# Patient Record
Sex: FEMALE | Race: WHITE | Employment: PART TIME | URBAN - NONMETROPOLITAN AREA
[De-identification: names, ages, dates, MRNs, and addresses within clinical notes are randomized per-mention and may not be internally consistent; named-entity substitution may affect disease eponyms.]

---

## 2017-01-12 ENCOUNTER — OFFICE VISIT (OUTPATIENT)
Dept: FAMILY MEDICINE CLINIC | Age: 22
End: 2017-01-12

## 2017-01-12 VITALS
HEIGHT: 69 IN | TEMPERATURE: 98.4 F | DIASTOLIC BLOOD PRESSURE: 78 MMHG | SYSTOLIC BLOOD PRESSURE: 136 MMHG | HEART RATE: 95 BPM | BODY MASS INDEX: 39.99 KG/M2 | OXYGEN SATURATION: 98 % | WEIGHT: 270 LBS

## 2017-01-12 DIAGNOSIS — Z11.3 ROUTINE SCREENING FOR STI (SEXUALLY TRANSMITTED INFECTION): ICD-10-CM

## 2017-01-12 DIAGNOSIS — R10.11 RUQ PAIN: ICD-10-CM

## 2017-01-12 DIAGNOSIS — Z12.4 CERVICAL CANCER SCREENING: ICD-10-CM

## 2017-01-12 DIAGNOSIS — Z12.4 CERVICAL CANCER SCREENING: Primary | ICD-10-CM

## 2017-01-12 LAB
ALBUMIN SERPL-MCNC: 4.1 G/DL (ref 3.9–4.9)
ALP BLD-CCNC: 47 U/L (ref 40–130)
ALT SERPL-CCNC: 11 U/L (ref 0–33)
AMYLASE: 77 U/L (ref 28–100)
ANION GAP SERPL CALCULATED.3IONS-SCNC: 15 MEQ/L (ref 7–13)
AST SERPL-CCNC: 13 U/L (ref 0–35)
BASOPHILS ABSOLUTE: 0 K/UL (ref 0–0.2)
BASOPHILS RELATIVE PERCENT: 0.7 %
BILIRUB SERPL-MCNC: 0.2 MG/DL (ref 0–1.2)
BUN BLDV-MCNC: 9 MG/DL (ref 6–20)
CALCIUM SERPL-MCNC: 8.9 MG/DL (ref 8.6–10.2)
CHLORIDE BLD-SCNC: 101 MEQ/L (ref 98–107)
CO2: 21 MEQ/L (ref 22–29)
CREAT SERPL-MCNC: 0.6 MG/DL (ref 0.5–0.9)
EOSINOPHILS ABSOLUTE: 0.1 K/UL (ref 0–0.7)
EOSINOPHILS RELATIVE PERCENT: 1.6 %
GFR AFRICAN AMERICAN: >60
GFR NON-AFRICAN AMERICAN: >60
GLOBULIN: 2.4 G/DL (ref 2.3–3.5)
GLUCOSE BLD-MCNC: 116 MG/DL (ref 74–109)
HCT VFR BLD CALC: 36.6 % (ref 37–47)
HEMOGLOBIN: 12.4 G/DL (ref 12–16)
LIPASE: 26 U/L (ref 13–60)
LYMPHOCYTES ABSOLUTE: 1.6 K/UL (ref 1–4.8)
LYMPHOCYTES RELATIVE PERCENT: 24.8 %
MCH RBC QN AUTO: 28.7 PG (ref 27–31.3)
MCHC RBC AUTO-ENTMCNC: 34 % (ref 33–37)
MCV RBC AUTO: 84.4 FL (ref 82–100)
MONOCYTES ABSOLUTE: 0.4 K/UL (ref 0.2–0.8)
MONOCYTES RELATIVE PERCENT: 6.9 %
NEUTROPHILS ABSOLUTE: 4.2 K/UL (ref 1.4–6.5)
NEUTROPHILS RELATIVE PERCENT: 66 %
PDW BLD-RTO: 12.4 % (ref 11.5–14.5)
PLATELET # BLD: 247 K/UL (ref 130–400)
POTASSIUM SERPL-SCNC: 3.8 MEQ/L (ref 3.5–5.1)
RBC # BLD: 4.33 M/UL (ref 4.2–5.4)
SODIUM BLD-SCNC: 137 MEQ/L (ref 132–144)
TOTAL PROTEIN: 6.5 G/DL (ref 6.4–8.1)
WBC # BLD: 6.4 K/UL (ref 4.8–10.8)

## 2017-01-12 PROCEDURE — 99395 PREV VISIT EST AGE 18-39: CPT | Performed by: NURSE PRACTITIONER

## 2017-01-12 RX ORDER — EPINEPHRINE 0.3 MG/.3ML
INJECTION SUBCUTANEOUS
Qty: 1 EACH | Refills: 1 | Status: SHIPPED | OUTPATIENT
Start: 2017-01-12

## 2017-01-12 RX ORDER — LEVONORGESTREL AND ETHINYL ESTRADIOL 0.1-0.02MG
1 KIT ORAL DAILY
Qty: 1 PACKET | Refills: 11 | Status: SHIPPED | OUTPATIENT
Start: 2017-01-12 | End: 2017-10-10

## 2017-01-14 LAB
CHLAMYDIA TRACHOMATIS AMPLIFIED DET: NEGATIVE
N GONORRHOEAE AMPLIFIED DET: NEGATIVE
SPECIMEN SOURCE: NORMAL

## 2017-07-17 ENCOUNTER — OFFICE VISIT (OUTPATIENT)
Dept: FAMILY MEDICINE CLINIC | Age: 22
End: 2017-07-17

## 2017-07-17 VITALS
HEART RATE: 73 BPM | SYSTOLIC BLOOD PRESSURE: 124 MMHG | OXYGEN SATURATION: 98 % | TEMPERATURE: 98.7 F | BODY MASS INDEX: 41.18 KG/M2 | DIASTOLIC BLOOD PRESSURE: 72 MMHG | HEIGHT: 69 IN | WEIGHT: 278 LBS

## 2017-07-17 DIAGNOSIS — R10.13 EPIGASTRIC PAIN: ICD-10-CM

## 2017-07-17 DIAGNOSIS — R11.0 NAUSEA: ICD-10-CM

## 2017-07-17 DIAGNOSIS — R11.0 NAUSEA: Primary | ICD-10-CM

## 2017-07-17 DIAGNOSIS — Z83.79 FAMILY HISTORY OF CROHN'S DISEASE: ICD-10-CM

## 2017-07-17 PROCEDURE — 1036F TOBACCO NON-USER: CPT | Performed by: NURSE PRACTITIONER

## 2017-07-17 PROCEDURE — 99213 OFFICE O/P EST LOW 20 MIN: CPT | Performed by: NURSE PRACTITIONER

## 2017-07-17 PROCEDURE — G8417 CALC BMI ABV UP PARAM F/U: HCPCS | Performed by: NURSE PRACTITIONER

## 2017-07-17 PROCEDURE — G8427 DOCREV CUR MEDS BY ELIG CLIN: HCPCS | Performed by: NURSE PRACTITIONER

## 2017-07-17 RX ORDER — OMEPRAZOLE 20 MG/1
20 CAPSULE, DELAYED RELEASE ORAL DAILY
Qty: 30 CAPSULE | Refills: 1 | Status: SHIPPED | OUTPATIENT
Start: 2017-07-17 | End: 2017-10-10 | Stop reason: ALTCHOICE

## 2017-07-17 RX ORDER — ASCORBIC ACID 500 MG
1000 TABLET ORAL DAILY
COMMUNITY

## 2017-07-17 ASSESSMENT — PATIENT HEALTH QUESTIONNAIRE - PHQ9
SUM OF ALL RESPONSES TO PHQ QUESTIONS 1-9: 0
SUM OF ALL RESPONSES TO PHQ9 QUESTIONS 1 & 2: 0
1. LITTLE INTEREST OR PLEASURE IN DOING THINGS: 0
2. FEELING DOWN, DEPRESSED OR HOPELESS: 0

## 2017-07-17 ASSESSMENT — ENCOUNTER SYMPTOMS
BELCHING: 0
DIARRHEA: 1
ABDOMINAL PAIN: 1
VOMITING: 1
CONSTIPATION: 1
NAUSEA: 1

## 2017-07-28 LAB
ALLERGEN CODFISH IGE: <0.1 KU/L
ALLERGEN COW MILK IGE: <0.1 KU/L
ALLERGEN EGG WHITE IGE: 0.13 KU/L
ALLERGEN GLUTEN IGE: <0.1 KU/L
ALLERGEN HAZELNUT/FILBERT IGE: <0.1 KU/L
ALLERGEN PEANUT (F13) IGE: <0.1 KU/L
ALLERGEN SCALLOP IGE: <0.1 KU/L
ALLERGEN SEE NOTE: NORMAL
ALLERGEN SHRIMP IGE: 0.79 KU/L
ALLERGEN SOYBEAN IGE: <0.1 KU/L
ALLERGEN WALNUT IGE: <0.1 KU/L
ALLERGEN WHEAT IGE: <0.1 KU/L
CELIAC PANEL: 9 UNITS (ref 0–19)
SESAME SEED IGE: <0.1 KU/L

## 2017-10-10 ENCOUNTER — OFFICE VISIT (OUTPATIENT)
Dept: FAMILY MEDICINE CLINIC | Age: 22
End: 2017-10-10

## 2017-10-10 VITALS
WEIGHT: 239.4 LBS | DIASTOLIC BLOOD PRESSURE: 72 MMHG | HEIGHT: 69 IN | TEMPERATURE: 98.8 F | OXYGEN SATURATION: 98 % | HEART RATE: 76 BPM | BODY MASS INDEX: 35.46 KG/M2 | SYSTOLIC BLOOD PRESSURE: 118 MMHG

## 2017-10-10 DIAGNOSIS — Z23 NEED FOR INFLUENZA VACCINATION: ICD-10-CM

## 2017-10-10 DIAGNOSIS — K21.9 GASTROESOPHAGEAL REFLUX DISEASE WITHOUT ESOPHAGITIS: Primary | ICD-10-CM

## 2017-10-10 DIAGNOSIS — Z11.1 TUBERCULOSIS SCREENING: ICD-10-CM

## 2017-10-10 PROCEDURE — 86580 TB INTRADERMAL TEST: CPT | Performed by: NURSE PRACTITIONER

## 2017-10-10 PROCEDURE — 90688 IIV4 VACCINE SPLT 0.5 ML IM: CPT | Performed by: NURSE PRACTITIONER

## 2017-10-10 PROCEDURE — 99395 PREV VISIT EST AGE 18-39: CPT | Performed by: NURSE PRACTITIONER

## 2017-10-10 PROCEDURE — 90471 IMMUNIZATION ADMIN: CPT | Performed by: NURSE PRACTITIONER

## 2017-10-10 ASSESSMENT — ENCOUNTER SYMPTOMS
ABDOMINAL PAIN: 0
ABDOMINAL DISTENTION: 0

## 2017-10-12 ENCOUNTER — NURSE ONLY (OUTPATIENT)
Dept: FAMILY MEDICINE CLINIC | Age: 22
End: 2017-10-12

## 2017-10-12 DIAGNOSIS — Z11.1 ENCOUNTER FOR PPD SKIN TEST READING: Primary | ICD-10-CM

## 2017-10-12 LAB
INDURATION: 0
TB SKIN TEST: NEGATIVE

## 2018-10-10 RX ORDER — NORGESTIMATE AND ETHINYL ESTRADIOL 7DAYSX3 28
1 KIT ORAL DAILY
Qty: 28 TABLET | Refills: 0 | Status: SHIPPED | OUTPATIENT
Start: 2018-10-10

## 2023-11-14 ENCOUNTER — OFFICE VISIT (OUTPATIENT)
Dept: ORTHOPEDIC SURGERY | Facility: CLINIC | Age: 28
End: 2023-11-14
Payer: COMMERCIAL

## 2023-11-14 DIAGNOSIS — M23.92 INTERNAL DERANGEMENT OF LEFT KNEE: Primary | ICD-10-CM

## 2023-11-14 PROCEDURE — 20610 DRAIN/INJ JOINT/BURSA W/O US: CPT | Performed by: ORTHOPAEDIC SURGERY

## 2023-11-14 PROCEDURE — 2500000005 HC RX 250 GENERAL PHARMACY W/O HCPCS: Performed by: ORTHOPAEDIC SURGERY

## 2023-11-14 PROCEDURE — 2500000004 HC RX 250 GENERAL PHARMACY W/ HCPCS (ALT 636 FOR OP/ED): Performed by: ORTHOPAEDIC SURGERY

## 2023-11-14 PROCEDURE — 99213 OFFICE O/P EST LOW 20 MIN: CPT | Performed by: ORTHOPAEDIC SURGERY

## 2023-11-14 PROCEDURE — 99203 OFFICE O/P NEW LOW 30 MIN: CPT | Performed by: ORTHOPAEDIC SURGERY

## 2023-11-14 RX ORDER — LIDOCAINE HYDROCHLORIDE 10 MG/ML
2 INJECTION INFILTRATION; PERINEURAL
Status: COMPLETED | OUTPATIENT
Start: 2023-11-14 | End: 2023-11-14

## 2023-11-14 RX ORDER — TRIAMCINOLONE ACETONIDE 40 MG/ML
40 INJECTION, SUSPENSION INTRA-ARTICULAR; INTRAMUSCULAR
Status: COMPLETED | OUTPATIENT
Start: 2023-11-14 | End: 2023-11-14

## 2023-11-14 RX ADMIN — LIDOCAINE HYDROCHLORIDE 2 ML: 10 INJECTION, SOLUTION INFILTRATION; PERINEURAL at 13:48

## 2023-11-14 RX ADMIN — TRIAMCINOLONE ACETONIDE 40 MG: 40 INJECTION, SUSPENSION INTRA-ARTICULAR; INTRAMUSCULAR at 13:48

## 2023-11-14 NOTE — PROGRESS NOTES
History of Present Illness   Patient returns today to review MRI.  The patient endorses persistent knee pain and occasional mechanical symptoms including locking, catching, and giving out.  These issues are refractory to multiple non-surgical treatments.  She noted an acute injury that occurred about 7 months ago as she was planting her leg and went to turn and talk to a coworker felt an acute pop along the medial posterior aspect of the left knee.  Since then she has had persistent pain and swelling that the entire lower extremity also endorses numbness, tingling and loss of sensation and fluid retention.  She has been seen by her primary care provider who referred us to orthopedics for further evaluation.    Review of Systems   GENERAL: Negative for malaise, significant weight loss, fever  MUSCULOSKELETAL: See HPI  NEURO:  Negative for numbness / tingling     Physical Examination:  Left Knee:  Skin healthy and intact  No gross varus or valgus alignment   Range of motion: no major deficits     Tenderness to palpation over joint line: medial  There is mild tenderness to palpation anteriorly with superior and inferior compression of the patella as well as medial lateral compression.  No laxity to valgus stress  No laxity to varus stress  Negative Lachman´s test  Negative anterior drawer test  Negative posterior drawer test  Positive Anselmo´s test  Neurovascular exam normal distally    Imaging  MRI: Reveals possible evidence of horizontal medial meniscal tearing versus vessel, limited due to quality of study.  No evidence of lateral meniscal pathology.  Intact ACL and PCL, mild fluid anteriorly surrounding the ACL.  No obvious displaced fragment of meniscus and/or chondral flap/fissure.  There is medial femoral osteoarthritic changes noted, mild to moderate Jackson femoral changes noted.    Assessment:    Patient with left knee pain concern for medial meniscal pathology, underlying degenerative changes.    Plan  We  reviewed the patient that we feel her examination and history are pointing us towards medial meniscal pathology likely near the root, however MRI is not positive for this finding although there is some small evidence that could suggest horizontal tearing versus blood vessel.  Given her medial joint line tenderness and predominant medial sided symptoms, could consider arthroscopy for excision of medial meniscus if there is a true tear versus diagnostic arthroscopy and chondroplasty.  We also discussed options for nonsurgical intervention such as corticosteroid injections, knee bracing, and physical therapy.  We feel these are appropriate given her underlying mild to moderate degenerative changes noted on x-ray and MRI.    Thiago Mcmullen PA-C        In a face to face encounter, I evaluated the patient and performed a physical examination, discussed pertinent diagnostic studies if indicated and discussed diagnosis and management strategies with both the patient and physician assistant / nurse practitioner.  I reviewed the PA/NP's note and agree with the documented findings and plan of care.    L Inj/Asp: L knee on 11/14/2023 1:48 PM  Indications: pain  Details: 22 G needle, anteromedial approach  Medications: 2 mL lidocaine 10 mg/mL (1 %); 40 mg triamcinolone acetonide 40 mg/mL  Outcome: tolerated well, no immediate complications  Procedure, treatment alternatives, risks and benefits explained, specific risks discussed. Consent was given by the patient. Immediately prior to procedure a time out was called to verify the correct patient, procedure, equipment, support staff and site/side marked as required. Patient was prepped and draped in the usual sterile fashion.         Discussed with patient concern for medial meniscal tear based on her exam MRI is inconclusive.  We discussed corticosteroid injection for diagnostic possible therapeutic reasons, we also discussed the possibly of a higher quality MRI if symptoms  return.    Matteo De Luna MD

## 2024-01-25 ENCOUNTER — OFFICE VISIT (OUTPATIENT)
Dept: ORTHOPEDIC SURGERY | Facility: CLINIC | Age: 29
End: 2024-01-25
Payer: COMMERCIAL

## 2024-01-25 DIAGNOSIS — M23.92 INTERNAL DERANGEMENT OF LEFT KNEE: ICD-10-CM

## 2024-01-25 PROCEDURE — 99213 OFFICE O/P EST LOW 20 MIN: CPT | Performed by: ORTHOPAEDIC SURGERY

## 2024-01-25 RX ORDER — METHYLPREDNISOLONE 4 MG/1
TABLET ORAL
Qty: 21 TABLET | Refills: 0 | Status: SHIPPED | OUTPATIENT
Start: 2024-01-25

## 2024-01-25 NOTE — PROGRESS NOTES
History of Present Illness:   Patient presents today for follow-up of the left knee for ongoing pain and mechanical symptoms.  She states that the corticosteroid injection that she received her last appointment did not help her any longer than about 10 to 15 days.  She also received an injection of Toradol at her place of work as she was in extreme pain, and this helped her for about 5 hours and then wore off.  So far she is taking naproxen, Aleve, ibuprofen, acetaminophen.  None of these helped her pain.    Review of Systems   GENERAL: Negative for malaise, significant weight loss, fever  MUSCULOSKELETAL: see HPI  NEURO:  Negative    Physical Examination:  Left knee:  Skin healthy and intact  No gross swelling or ecchymosis  No significant varus or valgus malalignment  Effusion: Mild     ROM:  Full flexion   Full extension  No pain with internal rotation of the hip  Tenderness to palpation: Medial joint line     No laxity to valgus stress  No laxity to varus stress  Negative Lachman´s test  Negative anterior drawer test  Negative posterior drawer test  Positive Anselmo´s test     Neurovascular exam normal distally    Imaging:  Deferred    Assessment:   Patient with left knee pain concern for medial meniscus tear    Plan:  We reviewed with the patient that we feel her previous MRI was inconclusive and does not give the definitive enough evidence to warrant operative indications we would recommend a higher quality 3 Radha MRI.  She is agreeable to this.  Also discussed Medrol Dosepak to reduce inflammation, she is agreeable.  Reviewed that we could also try Celebrex if the Medrol Dosepak fails in an attempt to achieve pain control prior to her MRI.  We highly recommend higher quality MRI with 3 Radha in order to appropriately visualize soft tissue structures.    Thiago Mcmullen PA-C    In a face to face encounter, I evaluated the patient and performed a physical examination, discussed pertinent diagnostic  studies if indicated and discussed diagnosis and management strategies with both the patient and physician assistant / nurse practitioner.  I reviewed the PA/NP's note and agree with the documented findings and plan of care.        Matteo De Luna MD

## 2024-02-12 ENCOUNTER — OFFICE VISIT (OUTPATIENT)
Dept: PRIMARY CARE | Facility: CLINIC | Age: 29
End: 2024-02-12
Payer: COMMERCIAL

## 2024-02-12 ENCOUNTER — APPOINTMENT (OUTPATIENT)
Dept: RADIOLOGY | Facility: CLINIC | Age: 29
End: 2024-02-12
Payer: COMMERCIAL

## 2024-02-12 ENCOUNTER — HOSPITAL ENCOUNTER (OUTPATIENT)
Dept: RADIOLOGY | Facility: CLINIC | Age: 29
Discharge: HOME | End: 2024-02-12
Payer: COMMERCIAL

## 2024-02-12 VITALS
OXYGEN SATURATION: 97 % | DIASTOLIC BLOOD PRESSURE: 84 MMHG | BODY MASS INDEX: 43.4 KG/M2 | HEIGHT: 69 IN | HEART RATE: 80 BPM | SYSTOLIC BLOOD PRESSURE: 122 MMHG | WEIGHT: 293 LBS

## 2024-02-12 DIAGNOSIS — R53.83 OTHER FATIGUE: ICD-10-CM

## 2024-02-12 DIAGNOSIS — Z13.220 SCREENING FOR LIPOID DISORDERS: ICD-10-CM

## 2024-02-12 DIAGNOSIS — Z00.00 GENERAL MEDICAL EXAM: Primary | ICD-10-CM

## 2024-02-12 DIAGNOSIS — M23.92 INTERNAL DERANGEMENT OF LEFT KNEE: ICD-10-CM

## 2024-02-12 DIAGNOSIS — M25.562 ACUTE PAIN OF LEFT KNEE: ICD-10-CM

## 2024-02-12 PROCEDURE — 73721 MRI JNT OF LWR EXTRE W/O DYE: CPT | Mod: LEFT SIDE | Performed by: STUDENT IN AN ORGANIZED HEALTH CARE EDUCATION/TRAINING PROGRAM

## 2024-02-12 PROCEDURE — 99204 OFFICE O/P NEW MOD 45 MIN: CPT | Performed by: NURSE PRACTITIONER

## 2024-02-12 PROCEDURE — 73721 MRI JNT OF LWR EXTRE W/O DYE: CPT | Mod: LT

## 2024-02-12 RX ORDER — EPINEPHRINE 0.3 MG/.3ML
1 INJECTION SUBCUTANEOUS ONCE AS NEEDED
COMMUNITY

## 2024-02-12 RX ORDER — LEVONORGESTREL 52 MG/1
1 INTRAUTERINE DEVICE INTRAUTERINE ONCE
COMMUNITY

## 2024-02-12 RX ORDER — ELETRIPTAN HYDROBROMIDE 40 MG/1
40 TABLET, FILM COATED ORAL ONCE AS NEEDED
COMMUNITY

## 2024-02-12 RX ORDER — ALBUTEROL SULFATE 0.83 MG/ML
SOLUTION RESPIRATORY (INHALATION)
COMMUNITY

## 2024-02-12 RX ORDER — IBUPROFEN 100 MG/5ML
1000 SUSPENSION, ORAL (FINAL DOSE FORM) ORAL DAILY
COMMUNITY
Start: 2019-09-11

## 2024-02-12 RX ORDER — OMEPRAZOLE 40 MG/1
40 CAPSULE, DELAYED RELEASE ORAL
COMMUNITY
Start: 2019-09-11 | End: 2024-02-26 | Stop reason: SDUPTHER

## 2024-02-12 RX ORDER — ALBUTEROL SULFATE 90 UG/1
AEROSOL, METERED RESPIRATORY (INHALATION)
COMMUNITY

## 2024-02-12 RX ORDER — GALCANEZUMAB 120 MG/ML
120 INJECTION, SOLUTION SUBCUTANEOUS
COMMUNITY
End: 2024-02-26 | Stop reason: SDUPTHER

## 2024-02-12 RX ORDER — ESCITALOPRAM OXALATE 20 MG/1
1 TABLET ORAL DAILY
COMMUNITY
Start: 2023-11-13 | End: 2024-02-26 | Stop reason: SDUPTHER

## 2024-02-12 RX ORDER — DULOXETIN HYDROCHLORIDE 30 MG/1
30 CAPSULE, DELAYED RELEASE ORAL DAILY
COMMUNITY
End: 2024-02-26 | Stop reason: SDUPTHER

## 2024-02-12 ASSESSMENT — ENCOUNTER SYMPTOMS
DIZZINESS: 0
PALPITATIONS: 0
NAUSEA: 0
HEADACHES: 1
FATIGUE: 1
DIARRHEA: 1
WEAKNESS: 0
VOMITING: 0

## 2024-02-12 NOTE — PROGRESS NOTES
"Subjective   Patient ID: Julia Bowie is a 28 y.o. female who presents for Establish Care and Fatigue.    HPI   28 year old female presents today to [facility] care. She states that she has chronic fatigue that has been going on for a couple of years. She feels that she can fall asleep at any moment. She has not had labs done in a year. She does report a history of IBS, reflux, asthma, TMJ, PCOS/endometriosis, Migraines, Depression/PTSD. He has seen GI in the past due to her family history of Crohn's disease- she was told she does not have Crohn's. She also has left knee pain. She was told she has a cyst (MRI done earlier today- she does not have results yet).      FEMALE HEALTH MAINTENANCE -  FLU:  Does not get flu vaccine due to local reaction on her arm.  PCV 13 (>65):  N/A  PPSV 23 (>65): N/A  SHINGLES shingrix (>50): N/A  RSV arexvy(>60): N/A     COLON CANCER SCREENING (45-74 years old): N/A  MAMMOGRAM (40-74 years old): N/A  DEXA SCAN (65- years old): N/A  Yearly Physical Today  PAP (21-29-every 3 years, 30-65 every 5 years Co-Testing)  scheduled with GYN March 2024  Tetanus-Every 10 years  2015  Gardasil Vaccine: until 26 year old   Dentist:  every 6 month    Review of Systems   Constitutional:  Positive for fatigue. Negative for chills and fever.   HENT:  Negative for congestion, sinus pressure, sinus pain and sore throat.    Respiratory:  Negative for cough, shortness of breath and wheezing.    Cardiovascular:  Negative for chest pain and palpitations.   Gastrointestinal:  Positive for diarrhea. Negative for nausea and vomiting.   Genitourinary:  Negative for dysuria and urgency.   Musculoskeletal:         Knee pain   Skin:  Negative for rash.   Neurological:  Positive for headaches. Negative for dizziness and weakness.       Objective   /84 (BP Location: Left arm, Patient Position: Sitting, BP Cuff Size: Large adult)   Pulse 80   Ht 1.753 m (5' 9\")   Wt (!) 159 kg (351 lb)   SpO2 97%   BMI " 51.83 kg/m²     Physical Exam  Vitals and nursing note reviewed.   Constitutional:       General: She is not in acute distress.     Appearance: Normal appearance. She is obese.   HENT:      Right Ear: Tympanic membrane normal.      Left Ear: Tympanic membrane normal.      Nose: No congestion.      Mouth/Throat:      Pharynx: No oropharyngeal exudate or posterior oropharyngeal erythema.   Eyes:      Conjunctiva/sclera: Conjunctivae normal.      Pupils: Pupils are equal, round, and reactive to light.   Cardiovascular:      Rate and Rhythm: Normal rate and regular rhythm.      Heart sounds: Normal heart sounds.   Pulmonary:      Effort: Pulmonary effort is normal.      Breath sounds: Normal breath sounds. No wheezing, rhonchi or rales.   Abdominal:      General: Abdomen is flat. Bowel sounds are normal.      Palpations: Abdomen is soft.   Musculoskeletal:         General: Tenderness present.      Comments: Tenderness noted over left knee.    Lymphadenopathy:      Cervical: No cervical adenopathy.   Skin:     General: Skin is warm and dry.   Neurological:      Mental Status: She is alert.   Psychiatric:         Mood and Affect: Mood normal.         Behavior: Behavior normal.         Assessment/Plan   Problem List Items Addressed This Visit    None  Visit Diagnoses         Codes    General medical exam    -  Primary Z00.00    Acute pain of left knee     M25.562    Relevant Orders    Sedimentation Rate    Rheumatoid factor    Other fatigue     R53.83    Relevant Orders    CBC and Auto Differential    Comprehensive Metabolic Panel    Tsh With Reflex To Free T4 If Abnormal    Vitamin B12    Vitamin D 25-Hydroxy,Total (for eval of Vitamin D levels)    Screening for lipoid disorders     Z13.220    Relevant Orders    Lipid panel        Check labs  Follow up in 2 weeks for recheck, or sooner with any additional concerns.

## 2024-02-14 ASSESSMENT — ENCOUNTER SYMPTOMS
COUGH: 0
SINUS PRESSURE: 0
DEPRESSION: 0
FEVER: 0
CHILLS: 0
SORE THROAT: 0
SHORTNESS OF BREATH: 0
SINUS PAIN: 0
DYSURIA: 0
OCCASIONAL FEELINGS OF UNSTEADINESS: 0
WHEEZING: 0
LOSS OF SENSATION IN FEET: 0

## 2024-02-19 ENCOUNTER — LAB (OUTPATIENT)
Dept: LAB | Facility: LAB | Age: 29
End: 2024-02-19
Payer: COMMERCIAL

## 2024-02-19 ENCOUNTER — OFFICE VISIT (OUTPATIENT)
Dept: ORTHOPEDIC SURGERY | Facility: CLINIC | Age: 29
End: 2024-02-19
Payer: COMMERCIAL

## 2024-02-19 DIAGNOSIS — M25.562 ACUTE PAIN OF LEFT KNEE: ICD-10-CM

## 2024-02-19 DIAGNOSIS — R53.83 OTHER FATIGUE: ICD-10-CM

## 2024-02-19 DIAGNOSIS — Z13.220 SCREENING FOR LIPOID DISORDERS: ICD-10-CM

## 2024-02-19 DIAGNOSIS — M23.92 INTERNAL DERANGEMENT OF LEFT KNEE: ICD-10-CM

## 2024-02-19 LAB
25(OH)D3 SERPL-MCNC: 28 NG/ML (ref 30–100)
ALBUMIN SERPL BCP-MCNC: 4.4 G/DL (ref 3.4–5)
ALP SERPL-CCNC: 56 U/L (ref 33–110)
ALT SERPL W P-5'-P-CCNC: 17 U/L (ref 7–45)
ANION GAP SERPL CALC-SCNC: 12 MMOL/L (ref 10–20)
AST SERPL W P-5'-P-CCNC: 15 U/L (ref 9–39)
BASOPHILS # BLD AUTO: 0.03 X10*3/UL (ref 0–0.1)
BASOPHILS NFR BLD AUTO: 0.4 %
BILIRUB SERPL-MCNC: 0.7 MG/DL (ref 0–1.2)
BUN SERPL-MCNC: 13 MG/DL (ref 6–23)
CALCIUM SERPL-MCNC: 9.6 MG/DL (ref 8.6–10.3)
CHLORIDE SERPL-SCNC: 102 MMOL/L (ref 98–107)
CHOLEST SERPL-MCNC: 165 MG/DL (ref 0–199)
CHOLESTEROL/HDL RATIO: 3.7
CO2 SERPL-SCNC: 29 MMOL/L (ref 21–32)
CREAT SERPL-MCNC: 0.77 MG/DL (ref 0.5–1.05)
EGFRCR SERPLBLD CKD-EPI 2021: >90 ML/MIN/1.73M*2
EOSINOPHIL # BLD AUTO: 0.13 X10*3/UL (ref 0–0.7)
EOSINOPHIL NFR BLD AUTO: 1.8 %
ERYTHROCYTE [DISTWIDTH] IN BLOOD BY AUTOMATED COUNT: 12.5 % (ref 11.5–14.5)
ERYTHROCYTE [SEDIMENTATION RATE] IN BLOOD BY WESTERGREN METHOD: 32 MM/H (ref 0–20)
GLUCOSE SERPL-MCNC: 96 MG/DL (ref 74–99)
HCT VFR BLD AUTO: 41.7 % (ref 36–46)
HDLC SERPL-MCNC: 44.6 MG/DL
HGB BLD-MCNC: 13.5 G/DL (ref 12–16)
IMM GRANULOCYTES # BLD AUTO: 0.03 X10*3/UL (ref 0–0.7)
IMM GRANULOCYTES NFR BLD AUTO: 0.4 % (ref 0–0.9)
LDLC SERPL CALC-MCNC: 101 MG/DL
LYMPHOCYTES # BLD AUTO: 2.31 X10*3/UL (ref 1.2–4.8)
LYMPHOCYTES NFR BLD AUTO: 32.7 %
MCH RBC QN AUTO: 29.3 PG (ref 26–34)
MCHC RBC AUTO-ENTMCNC: 32.4 G/DL (ref 32–36)
MCV RBC AUTO: 91 FL (ref 80–100)
MONOCYTES # BLD AUTO: 0.45 X10*3/UL (ref 0.1–1)
MONOCYTES NFR BLD AUTO: 6.4 %
NEUTROPHILS # BLD AUTO: 4.11 X10*3/UL (ref 1.2–7.7)
NEUTROPHILS NFR BLD AUTO: 58.3 %
NON HDL CHOLESTEROL: 120 MG/DL (ref 0–149)
NRBC BLD-RTO: 0 /100 WBCS (ref 0–0)
PLATELET # BLD AUTO: 303 X10*3/UL (ref 150–450)
POTASSIUM SERPL-SCNC: 4.5 MMOL/L (ref 3.5–5.3)
PROT SERPL-MCNC: 6.9 G/DL (ref 6.4–8.2)
RBC # BLD AUTO: 4.61 X10*6/UL (ref 4–5.2)
RHEUMATOID FACT SER NEPH-ACNC: <10 IU/ML (ref 0–15)
SODIUM SERPL-SCNC: 138 MMOL/L (ref 136–145)
TRIGL SERPL-MCNC: 96 MG/DL (ref 0–149)
TSH SERPL-ACNC: 3.24 MIU/L (ref 0.44–3.98)
VIT B12 SERPL-MCNC: 347 PG/ML (ref 211–911)
VLDL: 19 MG/DL (ref 0–40)
WBC # BLD AUTO: 7.1 X10*3/UL (ref 4.4–11.3)

## 2024-02-19 PROCEDURE — 99213 OFFICE O/P EST LOW 20 MIN: CPT | Performed by: ORTHOPAEDIC SURGERY

## 2024-02-19 PROCEDURE — 82607 VITAMIN B-12: CPT

## 2024-02-19 PROCEDURE — 3008F BODY MASS INDEX DOCD: CPT | Performed by: ORTHOPAEDIC SURGERY

## 2024-02-19 PROCEDURE — 80053 COMPREHEN METABOLIC PANEL: CPT

## 2024-02-19 PROCEDURE — 85652 RBC SED RATE AUTOMATED: CPT

## 2024-02-19 PROCEDURE — 84443 ASSAY THYROID STIM HORMONE: CPT

## 2024-02-19 PROCEDURE — 86431 RHEUMATOID FACTOR QUANT: CPT

## 2024-02-19 PROCEDURE — 82306 VITAMIN D 25 HYDROXY: CPT

## 2024-02-19 PROCEDURE — 80061 LIPID PANEL: CPT

## 2024-02-19 PROCEDURE — 36415 COLL VENOUS BLD VENIPUNCTURE: CPT

## 2024-02-19 PROCEDURE — 85025 COMPLETE CBC W/AUTO DIFF WBC: CPT

## 2024-02-19 RX ORDER — CELECOXIB 100 MG/1
100 CAPSULE ORAL 2 TIMES DAILY
Qty: 60 CAPSULE | Refills: 0 | Status: SHIPPED | OUTPATIENT
Start: 2024-02-19 | End: 2024-03-20

## 2024-02-19 NOTE — PROGRESS NOTES
History of Present Illness:   Patient presents today for further evaluation of her left knee pain has been going on for several months.  She denies any obvious recurring mechanical symptoms but does endorse persistent medial sided pain today.  She states that when she is on her feet for many hours during her workday this bothers her.  Her steroid injection did not help her.  She received a Toradol injection at her workplace, this did help her.  She has been taking Advil and ibuprofen, as well as Tylenol and this has not helped.    Review of Systems   GENERAL: Negative for malaise, significant weight loss, fever  MUSCULOSKELETAL: see HPI  NEURO:  Negative    Physical Examination:  Left knee:  Skin healthy and intact  No gross swelling or ecchymosis  No varus malalignment  No valgus malalignment   No effusion  ROM:  Full flexion   Full extension  No pain with internal rotation of the hip     Moderate tenderness to palpation over medial joint line  No tenderness to palpation over lateral joint line  No laxity to valgus stress  No laxity to varus stress  Negative Lachman´s test  Negative anterior drawer test  Negative posterior drawer test  Positive Anselmo´s test     Neurovascular exam normal distally    Imaging:  High-quality 3 Radha MRI reveals evidence of mild articular cartilage wear along the medial femoral condyle.  No evidence of medial or lateral meniscus tearing.  No obvious internal derangement to the knee beyond these cartilaginous changes.  There is lateral maltracking of the patella noted without chondral wear.    Assessment:   Patient with left knee pain, underlying mild medial femoral-tibial osteoarthritis.    Plan:  We reviewed with the patient that we feel her knee pain is related to this small area of focal chondral wear, and would recommend continue anti-inflammatories, rest, ice, as well as nonweightbearing aerobic activity and weight loss.  Patient understands and agreeable to this plan.  Also  discussed the possibility of ultrasound-guided injection into the knee if this fails to improve over the next several months.  Additionally recommended physical therapy for continuing range of motion and exercise to build strength.  Recommended against surgical intervention this time.    Thiago Mcmullen PA-C    In a face to face encounter, I evaluated the patient and performed a physical examination, discussed pertinent diagnostic studies if indicated and discussed diagnosis and management strategies with both the patient and physician assistant / nurse practitioner.  I reviewed the PA/NP's note and agree with the documented findings and plan of care.    Discussed with the patient MRI does not demonstrate any obvious meniscal pathology.  She does have some areas of early chondral wear discussed weight loss strategies, anti-inflammatories reviewing risks and benefits.    Matteo De Luna MD

## 2024-02-26 ENCOUNTER — OFFICE VISIT (OUTPATIENT)
Dept: PRIMARY CARE | Facility: CLINIC | Age: 29
End: 2024-02-26
Payer: COMMERCIAL

## 2024-02-26 VITALS
DIASTOLIC BLOOD PRESSURE: 80 MMHG | BODY MASS INDEX: 43.4 KG/M2 | WEIGHT: 293 LBS | HEART RATE: 90 BPM | SYSTOLIC BLOOD PRESSURE: 114 MMHG | HEIGHT: 69 IN | TEMPERATURE: 97.8 F | OXYGEN SATURATION: 97 % | RESPIRATION RATE: 16 BRPM

## 2024-02-26 DIAGNOSIS — K21.9 GASTROESOPHAGEAL REFLUX DISEASE WITHOUT ESOPHAGITIS: Primary | ICD-10-CM

## 2024-02-26 DIAGNOSIS — F41.8 MIXED ANXIETY AND DEPRESSIVE DISORDER: ICD-10-CM

## 2024-02-26 DIAGNOSIS — G43.809 OTHER MIGRAINE WITHOUT STATUS MIGRAINOSUS, NOT INTRACTABLE: ICD-10-CM

## 2024-02-26 DIAGNOSIS — R53.83 OTHER FATIGUE: ICD-10-CM

## 2024-02-26 DIAGNOSIS — E66.01 SEVERE OBESITY (MULTI): ICD-10-CM

## 2024-02-26 PROBLEM — G43.909 MIGRAINE: Status: ACTIVE | Noted: 2024-02-26

## 2024-02-26 PROCEDURE — 99213 OFFICE O/P EST LOW 20 MIN: CPT | Performed by: NURSE PRACTITIONER

## 2024-02-26 RX ORDER — DULOXETIN HYDROCHLORIDE 30 MG/1
30 CAPSULE, DELAYED RELEASE ORAL DAILY
Qty: 90 CAPSULE | Refills: 1 | Status: SHIPPED | OUTPATIENT
Start: 2024-02-26

## 2024-02-26 RX ORDER — OMEPRAZOLE 40 MG/1
40 CAPSULE, DELAYED RELEASE ORAL
Qty: 90 CAPSULE | Refills: 1 | Status: SHIPPED | OUTPATIENT
Start: 2024-02-26

## 2024-02-26 RX ORDER — GALCANEZUMAB 120 MG/ML
120 INJECTION, SOLUTION SUBCUTANEOUS
Qty: 1 ML | Refills: 1 | Status: SHIPPED | OUTPATIENT
Start: 2024-02-26

## 2024-02-26 RX ORDER — ESCITALOPRAM OXALATE 20 MG/1
20 TABLET ORAL DAILY
Qty: 90 TABLET | Refills: 1 | Status: SHIPPED | OUTPATIENT
Start: 2024-02-26

## 2024-02-26 ASSESSMENT — ENCOUNTER SYMPTOMS
NAUSEA: 0
COUGH: 0
VOMITING: 0
PALPITATIONS: 0
CHILLS: 0
WEAKNESS: 0
FEVER: 0
OCCASIONAL FEELINGS OF UNSTEADINESS: 0
SHORTNESS OF BREATH: 0
WHEEZING: 0
SORE THROAT: 0
DIARRHEA: 1
DYSURIA: 0
FATIGUE: 1
LOSS OF SENSATION IN FEET: 0
HEADACHES: 1
SINUS PAIN: 0
SINUS PRESSURE: 0
DEPRESSION: 0
DIZZINESS: 0

## 2024-02-26 NOTE — PROGRESS NOTES
"Subjective   Patient ID: Julia Bowie is a 28 y.o. female who presents for Results (Patient presents in office to review lab results. ).    HPI   28 year old female (PMH: IBS, reflux, asthma, TMJ, PCOS/endometriosis, Migraines, Depression/PTSD) presents today to review labs and is needing some prescriptions refilled. She has followed up with Ortho due to her knee pain since her previous visit and was told that she had arthritis and losing weight would help her.   PAP scheduled with GYN next month.    Review of Systems   Constitutional:  Positive for fatigue. Negative for chills and fever.   HENT:  Negative for congestion, sinus pressure, sinus pain and sore throat.    Respiratory:  Negative for cough, shortness of breath and wheezing.    Cardiovascular:  Negative for chest pain and palpitations.   Gastrointestinal:  Positive for diarrhea. Negative for nausea and vomiting.   Genitourinary:  Negative for dysuria and urgency.   Musculoskeletal:         Knee pain   Skin:  Negative for rash.   Neurological:  Positive for headaches. Negative for dizziness and weakness.       Objective   /80 (BP Location: Right arm, Patient Position: Sitting, BP Cuff Size: Large adult)   Pulse 90   Temp 36.6 °C (97.8 °F) (Temporal)   Resp 16   Ht 1.753 m (5' 9\")   Wt (!) 161 kg (356 lb)   SpO2 97%   BMI 52.57 kg/m²     Physical Exam  Vitals and nursing note reviewed.   Constitutional:       General: She is not in acute distress.     Appearance: Normal appearance. She is obese.   Eyes:      Conjunctiva/sclera: Conjunctivae normal.   Neck:      Thyroid: No thyroid mass, thyromegaly or thyroid tenderness.   Cardiovascular:      Rate and Rhythm: Normal rate and regular rhythm.      Heart sounds: Normal heart sounds.   Pulmonary:      Effort: Pulmonary effort is normal.      Breath sounds: Normal breath sounds. No wheezing, rhonchi or rales.   Lymphadenopathy:      Cervical: No cervical adenopathy.   Skin:     General: Skin is " warm and dry.   Neurological:      Mental Status: She is alert.   Psychiatric:         Mood and Affect: Mood normal.         Behavior: Behavior normal.         Assessment/Plan   Problem List Items Addressed This Visit             ICD-10-CM    Fatigue R53.83    GERD (gastroesophageal reflux disease) - Primary K21.9    Relevant Medications    omeprazole (PriLOSEC) 40 mg DR capsule    Migraine G43.909    Relevant Medications    galcanezumab (Emgality Pen) 120 mg/mL auto-injector    Mixed anxiety and depressive disorder F41.8    Relevant Medications    DULoxetine (Cymbalta) 30 mg DR capsule    escitalopram (Lexapro) 20 mg tablet    Severe obesity (CMS/HCC) E66.01    Relevant Medications    semaglutide 0.25 mg or 0.5 mg (2 mg/3 mL) pen injector     Other Visit Diagnoses         Codes    BMI 50.0-59.9, adult (CMS/HCC)     Z68.43    Relevant Medications    semaglutide 0.25 mg or 0.5 mg (2 mg/3 mL) pen injector        Labs reviewed with patient. Encouraged healthy diet and regular exercise. She is wanting to try semaglutide for weight loss. She has taken Adipex before and was unable to tolerate due to side effects. Educated on black box warning of Thyroid C-Cell Tumor risk. Will start 0.25 mg weekly x 4 weeks, then follow up.   Encouraged counseling for anxiety/depression- she is wanting to hold on referral for now. Denies any SI/HI.   Follow up in 4 weeks for recheck, or sooner with any additional concerns.

## 2024-03-07 ENCOUNTER — TELEPHONE (OUTPATIENT)
Dept: PRIMARY CARE | Facility: CLINIC | Age: 29
End: 2024-03-07
Payer: COMMERCIAL

## 2024-03-07 NOTE — TELEPHONE ENCOUNTER
Patient aware of medication not getting covered from insurance.     Patient understood and canceled next appt due to med not getting covered.

## 2024-03-07 NOTE — TELEPHONE ENCOUNTER
Patient of  Yamini Nichols submitted for ozempic,     It was denied patient needs to  have documentation of having type II diabetes    Patient was asking if you will prescribe wegovy for weight loss for her?   If you can call her and explain this to her

## 2024-03-07 NOTE — TELEPHONE ENCOUNTER
Please call patient and let her know that she was originally rx semaglutide which is wegovy. If insurance is not approving, we can offer an appointment with one of the  to see if appetite suppressants would be beneficial for her

## 2024-03-12 NOTE — PROGRESS NOTES
Vaccine Information Sheet, \"Influenza - Inactivated\"  given to Chantal Salcedo, or parent/legal guardian of  Chantal Salcedo and verbalized understanding. Patient responses:    Have you ever had a reaction to a flu vaccine? No  Are you able to eat eggs without adverse effects? Yes  Do you have any current illness? No  Have you ever had Guillian [place] Syndrome? No    Flu vaccine given per order. Please see immunization tab. After obtaining consent, and per orders of Dr tuttle injection of flu and TB given in Left deltoid and LT arm by Isidra Valente. Patient instructed to remain in clinic for 20 minutes afterwards, and to report any adverse reaction to me immediately.
0.3 MG/0.3ML SOAJ injection Use as directed for allergic reaction 1 each 1    naproxen (NAPROSYN) 500 MG tablet Take 1 tablet by mouth 2 times daily (with meals) for 14 days 28 tablet 1     No current facility-administered medications on file prior to visit. Allergies   Allergen Reactions    Iodine Anaphylaxis    Shellfish-Derived Products Anaphylaxis    Dye [Iodides] Hives     IPV DYE    Vicodin [Hydrocodone-Acetaminophen] Nausea And Vomiting       Review of Systems   Gastrointestinal: Negative for abdominal distention and abdominal pain. Objective  Vitals:    10/10/17 1448   Weight: 239 lb 6.4 oz (108.6 kg)   Height: 5' 9\" (1.753 m)     Physical Exam   Constitutional: She is oriented to person, place, and time. She appears well-developed and well-nourished. Eyes: Conjunctivae are normal. Pupils are equal, round, and reactive to light. Neck: Neck supple. No thyromegaly present. Cardiovascular: Normal rate, regular rhythm, normal heart sounds and intact distal pulses. Pulmonary/Chest: Effort normal and breath sounds normal.   Lymphadenopathy:     She has no cervical adenopathy. Neurological: She is alert and oriented to person, place, and time. Psychiatric: She has a normal mood and affect. Her behavior is normal. Judgment and thought content normal.     Assessment & Plan    1. Gastroesophageal reflux disease without esophagitis  esomeprazole (NEXIUM) 20 MG delayed release capsule   2. Tuberculosis screening  Mantoux testing   3.  Need for influenza vaccination  INFLUENZA, QUADV, 3 YRS AND OLDER, IM, MDV, 0.5ML (FLUZONE QUADV)       Orders Placed This Encounter   Procedures    INFLUENZA, QUADV, 3 YRS AND OLDER, IM, MDV, 0.5ML (FLUZONE QUADV)    Mantoux testing       Orders Placed This Encounter   Medications    esomeprazole (NEXIUM) 20 MG delayed release capsule     Sig: Take 1 capsule by mouth daily     Dispense:  30 capsule     Refill:  3     Side effects, adverse effects of the
No
never

## 2024-03-26 ENCOUNTER — APPOINTMENT (OUTPATIENT)
Dept: PRIMARY CARE | Facility: CLINIC | Age: 29
End: 2024-03-26
Payer: COMMERCIAL

## 2024-03-27 ENCOUNTER — OFFICE VISIT (OUTPATIENT)
Dept: OBSTETRICS AND GYNECOLOGY | Facility: CLINIC | Age: 29
End: 2024-03-27
Payer: COMMERCIAL

## 2024-03-27 VITALS
HEIGHT: 69 IN | WEIGHT: 293 LBS | BODY MASS INDEX: 43.4 KG/M2 | DIASTOLIC BLOOD PRESSURE: 72 MMHG | SYSTOLIC BLOOD PRESSURE: 114 MMHG

## 2024-03-27 DIAGNOSIS — Z30.431 SURVEILLANCE OF INTRAUTERINE CONTRACEPTION: Primary | ICD-10-CM

## 2024-03-27 DIAGNOSIS — Z01.419 WELL WOMAN EXAM WITH ROUTINE GYNECOLOGICAL EXAM: ICD-10-CM

## 2024-03-27 DIAGNOSIS — Z11.3 ROUTINE SCREENING FOR STI (SEXUALLY TRANSMITTED INFECTION): ICD-10-CM

## 2024-03-27 PROCEDURE — 3008F BODY MASS INDEX DOCD: CPT | Performed by: ADVANCED PRACTICE MIDWIFE

## 2024-03-27 PROCEDURE — 87800 DETECT AGNT MULT DNA DIREC: CPT

## 2024-03-27 PROCEDURE — 87661 TRICHOMONAS VAGINALIS AMPLIF: CPT

## 2024-03-27 PROCEDURE — 1036F TOBACCO NON-USER: CPT | Performed by: ADVANCED PRACTICE MIDWIFE

## 2024-03-27 PROCEDURE — 99385 PREV VISIT NEW AGE 18-39: CPT | Performed by: ADVANCED PRACTICE MIDWIFE

## 2024-03-27 NOTE — PROGRESS NOTES
"Subjective   Julia Bowie is a 28 y.o. female new patient who is here for a routine annual exam.     Current contraception: IUD- Mirena, inserted Nov. 2019. Pt is pleased with brand/method.   Reports having spotting approximately every 3 months, more so with increased stress or when taking antibiotics.   Last Pap: Reportedly done last year through [facility] and [facility]. This result not available for review.   History of abnormal Pap smear: no  PCP: Yamini Shay   STD Screening: Desires   UTI S/S: Denies   Pelvic/Abdominal Pain: Denies   Unusual Vaginal Discharge: Denies   Works as  at [employer].   No other questions or concerns.     Review of Systems    Objective   /72   Ht 1.753 m (5' 9\")   Wt (!) 161 kg (354 lb 11.2 oz)   BMI 52.38 kg/m²     Physical Exam  Constitutional:       Appearance: Normal appearance. She is obese.   Genitourinary:      Vulva, bladder and urethral meatus normal.      Genitourinary Comments: IUD strings noted with use of Cytobrush, though are very short. Pt advised.   Bimanual exam limited by body habitus, though no tenderness noted on palpation of uterus or adnexa.         Right Adnexa: not tender.     Left Adnexa: not tender.     IUD strings visualized.      Uterus is not tender.   Breasts:     Right: Normal.      Left: Normal.   HENT:      Head: Normocephalic.   Pulmonary:      Effort: Pulmonary effort is normal.   Abdominal:      Palpations: Abdomen is soft.      Tenderness: There is no abdominal tenderness.   Musculoskeletal:         General: Normal range of motion.   Neurological:      General: No focal deficit present.      Mental Status: She is alert and oriented to person, place, and time.   Psychiatric:         Mood and Affect: Mood normal.         Behavior: Behavior normal.         Thought Content: Thought content normal.         Judgment: Judgment normal.   Vitals and nursing note reviewed.        Assessment/Plan   Reviewed recommendations " regarding frequency of pap screening. Pap deferred today.   Discussed signing release of records to obtain last pap results from [facility].   STD screening cultures sent.   Advised of 8 year contraceptive effectiveness of Mirena IUD.   RTC in 1 year for annual exam, or sooner if medically necessary.

## 2024-03-28 ENCOUNTER — TELEPHONE (OUTPATIENT)
Dept: OBSTETRICS AND GYNECOLOGY | Facility: CLINIC | Age: 29
End: 2024-03-28
Payer: COMMERCIAL

## 2024-03-28 LAB
C TRACH RRNA SPEC QL NAA+PROBE: NEGATIVE
N GONORRHOEA DNA SPEC QL PROBE+SIG AMP: NEGATIVE
T VAGINALIS RRNA SPEC QL NAA+PROBE: NEGATIVE

## 2024-03-28 NOTE — TELEPHONE ENCOUNTER
----- Message from KARLA Ferro sent at 3/28/2024 11:37 AM EDT -----  Please advise Julia that her GC/CT and Trich were negative.

## 2024-04-01 DIAGNOSIS — E66.01 SEVERE OBESITY (MULTI): ICD-10-CM

## 2024-04-04 RX ORDER — SEMAGLUTIDE 0.68 MG/ML
0.25 INJECTION, SOLUTION SUBCUTANEOUS
Qty: 3 ML | Refills: 0 | Status: SHIPPED | OUTPATIENT
Start: 2024-04-04

## 2024-04-04 NOTE — TELEPHONE ENCOUNTER
Patient insurance is not going to cover ozempic for weight loss.     She has to have type II diabetes andtry and fail formulary medications    She is asking if she would prescribe wegovy or zepbound if you can give her a call

## 2024-06-14 DIAGNOSIS — F41.8 MIXED ANXIETY AND DEPRESSIVE DISORDER: ICD-10-CM

## 2024-06-18 RX ORDER — DULOXETIN HYDROCHLORIDE 30 MG/1
30 CAPSULE, DELAYED RELEASE ORAL DAILY
Qty: 90 CAPSULE | Refills: 0 | Status: SHIPPED | OUTPATIENT
Start: 2024-06-18

## 2024-06-18 RX ORDER — ESCITALOPRAM OXALATE 20 MG/1
20 TABLET ORAL DAILY
Qty: 90 TABLET | Refills: 0 | Status: SHIPPED | OUTPATIENT
Start: 2024-06-18

## 2024-07-09 ENCOUNTER — APPOINTMENT (OUTPATIENT)
Dept: PRIMARY CARE | Facility: CLINIC | Age: 29
End: 2024-07-09
Payer: COMMERCIAL

## 2024-07-09 VITALS
SYSTOLIC BLOOD PRESSURE: 130 MMHG | BODY MASS INDEX: 43.4 KG/M2 | OXYGEN SATURATION: 98 % | WEIGHT: 293 LBS | DIASTOLIC BLOOD PRESSURE: 90 MMHG | HEIGHT: 69 IN | HEART RATE: 90 BPM | TEMPERATURE: 97.6 F | RESPIRATION RATE: 16 BRPM

## 2024-07-09 DIAGNOSIS — I10 HYPERTENSION, UNSPECIFIED TYPE: Primary | ICD-10-CM

## 2024-07-09 DIAGNOSIS — R07.9 CHEST PAIN, UNSPECIFIED TYPE: ICD-10-CM

## 2024-07-09 DIAGNOSIS — K21.9 GASTROESOPHAGEAL REFLUX DISEASE WITHOUT ESOPHAGITIS: ICD-10-CM

## 2024-07-09 PROCEDURE — 99213 OFFICE O/P EST LOW 20 MIN: CPT | Performed by: NURSE PRACTITIONER

## 2024-07-09 RX ORDER — HYDROCHLOROTHIAZIDE 12.5 MG/1
12.5 TABLET ORAL DAILY
Qty: 30 TABLET | Refills: 0 | Status: SHIPPED | OUTPATIENT
Start: 2024-07-09 | End: 2024-08-08

## 2024-07-09 RX ORDER — OMEPRAZOLE 40 MG/1
40 CAPSULE, DELAYED RELEASE ORAL
Qty: 90 CAPSULE | Refills: 1 | Status: SHIPPED | OUTPATIENT
Start: 2024-07-09

## 2024-07-09 ASSESSMENT — ENCOUNTER SYMPTOMS
WHEEZING: 0
SHORTNESS OF BREATH: 1
CHILLS: 0
NAUSEA: 1
COUGH: 0
NERVOUS/ANXIOUS: 0
FEVER: 0
DYSPHORIC MOOD: 0
FATIGUE: 1
HYPERTENSION: 1
WEAKNESS: 1
HEADACHES: 1

## 2024-07-09 ASSESSMENT — PATIENT HEALTH QUESTIONNAIRE - PHQ9
2. FEELING DOWN, DEPRESSED OR HOPELESS: NOT AT ALL
SUM OF ALL RESPONSES TO PHQ9 QUESTIONS 1 AND 2: 2
10. IF YOU CHECKED OFF ANY PROBLEMS, HOW DIFFICULT HAVE THESE PROBLEMS MADE IT FOR YOU TO DO YOUR WORK, TAKE CARE OF THINGS AT HOME, OR GET ALONG WITH OTHER PEOPLE: SOMEWHAT DIFFICULT
1. LITTLE INTEREST OR PLEASURE IN DOING THINGS: MORE THAN HALF THE DAYS

## 2024-07-09 NOTE — PROGRESS NOTES
Subjective   Patient ID: Julia Bowie is a 29 y.o. female who presents for Chest Pain, Migraine, and Hypertension.    Patient states she went to the ER for chest pain, migraine and high blood pressure. Patient states they did an EKG, Chest X-ray and blood work and results all came back fine. Patient still complains of chest pain today and slight headache.    Chest Pain   Episode frequency: comes and goes. The pain is at a severity of 5/10. The quality of the pain is described as stabbing. Associated symptoms include headaches, nausea, shortness of breath and weakness. Pertinent negatives include no abdominal pain, cough, fever, palpitations or vomiting. Associated symptoms comments: lightheaded.   Her family medical history is significant for diabetes, heart disease and hypertension.   Migraine   Associated symptoms include nausea and weakness. Pertinent negatives include no abdominal pain, coughing, eye redness, fever, sore throat or vomiting.   Hypertension  Associated symptoms include chest pain, headaches and shortness of breath. Pertinent negatives include no palpitations.     29 year old female presents today to follow up from an ER visit on 7/5/2024. She had a migraine headache and then developing stabbing chest pains while resting that evening. She does experience shortness of breath, but contributes that to her asthma. She was feeling nauseated, but denies any vomiting, fevers or diarrhea. She did stop smoking/vaping 6 months ago. She does report a family history of heart disease.   ER work up:   EKG- NSR  Chest xray: No significant acute radiographic abnormality.   CT brain:  no evidence of an acute intracranial abnormality   D-Dimer (250), CBC +Diff (WNL) , UA, Urine HCG (neg)Troponin (<6). Magnesium (2.0), BMP (Glu 100- otherwise WNL)   She was given a toradol inj and discharged. She is continuing to experience chest pains occasionally. She is having trouble losing weight and is also requesting a  "referral to endocrinology.   She is also been experiencing some lower extremity swelling.     Review of Systems   Constitutional:  Positive for fatigue. Negative for chills and fever.   HENT:  Negative for congestion, sinus pain and sore throat.    Eyes:  Negative for redness and visual disturbance.   Respiratory:  Positive for shortness of breath. Negative for cough and wheezing.    Cardiovascular:  Positive for chest pain and leg swelling. Negative for palpitations.   Gastrointestinal:  Positive for nausea. Negative for abdominal pain, constipation, diarrhea and vomiting.   Skin:  Negative for rash.   Neurological:  Positive for weakness and headaches.   Psychiatric/Behavioral:  Negative for dysphoric mood. The patient is not nervous/anxious.        Objective   /90 (BP Location: Right arm, Patient Position: Sitting, BP Cuff Size: Large adult)   Pulse 90   Temp 36.4 °C (97.6 °F) (Temporal)   Resp 16   Ht 1.753 m (5' 9\")   Wt (!) 164 kg (361 lb)   SpO2 98%   BMI 53.31 kg/m²     Physical Exam  Vitals and nursing note reviewed.   Constitutional:       Appearance: Normal appearance. She is obese.   Cardiovascular:      Rate and Rhythm: Normal rate and regular rhythm.      Heart sounds: Normal heart sounds.   Pulmonary:      Effort: Pulmonary effort is normal.      Breath sounds: Normal breath sounds.   Chest:      Chest wall: Tenderness present.   Musculoskeletal:      Right lower leg: Edema present.      Left lower leg: Edema present.   Neurological:      Mental Status: She is alert.   Psychiatric:         Mood and Affect: Mood normal.         Behavior: Behavior normal.         Assessment/Plan   Problem List Items Addressed This Visit             ICD-10-CM    GERD (gastroesophageal reflux disease) K21.9    Relevant Medications    omeprazole (PriLOSEC) 40 mg DR capsule     Other Visit Diagnoses         Codes    Hypertension, unspecified type    -  Primary I10    Relevant Medications    hydroCHLOROthiazide " (Microzide) 12.5 mg tablet    Other Relevant Orders    Referral to Cardiology    Chest pain, unspecified type     R07.9    Relevant Medications    hydroCHLOROthiazide (Microzide) 12.5 mg tablet    Other Relevant Orders    Referral to Cardiology    BMI 50.0-59.9, adult (Multi)     Z68.43    Relevant Orders    Referral to Endocrinology          ER records reviewed (Negative D-dimer. Negative troponins. Nonischemic ECG. Normal neurological exam. Normal CT head.)  Will refer to cardiology for further evaluation/treatment due to continued symptoms and + family history  Referral provided to endocrinology.   Start hydrochlorothiazide as directed.   If developing any returning or worsening symptoms, chest pains, shortness of breath, dizziness or lethargy, proceed to ER, or call 911.   Follow up in 4-6 weeks for recheck, or sooner with any additional concerns.

## 2024-07-10 ASSESSMENT — ENCOUNTER SYMPTOMS
SORE THROAT: 0
SINUS PAIN: 0
EYE REDNESS: 0
DIARRHEA: 0
LOSS OF SENSATION IN FEET: 0
OCCASIONAL FEELINGS OF UNSTEADINESS: 0
CONSTIPATION: 0
VOMITING: 0
PALPITATIONS: 0
ABDOMINAL PAIN: 0
DEPRESSION: 0

## 2024-07-16 ENCOUNTER — APPOINTMENT (OUTPATIENT)
Dept: CARDIOLOGY | Facility: CLINIC | Age: 29
End: 2024-07-16
Payer: COMMERCIAL

## 2024-07-16 VITALS
HEIGHT: 69 IN | BODY MASS INDEX: 43.4 KG/M2 | SYSTOLIC BLOOD PRESSURE: 122 MMHG | DIASTOLIC BLOOD PRESSURE: 72 MMHG | HEART RATE: 92 BPM | WEIGHT: 293 LBS

## 2024-07-16 DIAGNOSIS — R07.9 CHEST PAIN, UNSPECIFIED TYPE: ICD-10-CM

## 2024-07-16 DIAGNOSIS — I10 HYPERTENSION, UNSPECIFIED TYPE: ICD-10-CM

## 2024-07-16 PROCEDURE — 99203 OFFICE O/P NEW LOW 30 MIN: CPT | Performed by: INTERNAL MEDICINE

## 2024-07-16 PROCEDURE — 93000 ELECTROCARDIOGRAM COMPLETE: CPT | Performed by: INTERNAL MEDICINE

## 2024-07-16 PROCEDURE — 3008F BODY MASS INDEX DOCD: CPT | Performed by: INTERNAL MEDICINE

## 2024-07-16 PROCEDURE — 1036F TOBACCO NON-USER: CPT | Performed by: INTERNAL MEDICINE

## 2024-07-16 PROCEDURE — 3078F DIAST BP <80 MM HG: CPT | Performed by: INTERNAL MEDICINE

## 2024-07-16 PROCEDURE — 3074F SYST BP LT 130 MM HG: CPT | Performed by: INTERNAL MEDICINE

## 2024-07-16 NOTE — ASSESSMENT & PLAN NOTE
Pain is identically reproducible with palpation.  No additional cardiac testing is necessary at present.    Orders:    Referral to Cardiology    ECG 12 lead (Clinic Performed)

## 2024-07-16 NOTE — PATIENT INSTRUCTIONS
"It was my pleasure to meet you.  I look forward to being your cardiologist.  I am a huge believer in communicating with my patients.  Please contact me at any time, if anything is not clear to you regarding anything we have discussed, or if new questions occur to you.     You should increase your intake of fresh fruits and vegetables.  Try to consume 9-12 servings per day of such foods.  You should increase your intake of deep sea fish such as salmon and tuna.  Try to get two servings per week of fish, but if you are a pregnant woman, talk to your obstetrician before increasing your fish intake.  You should increase your intake of unprocessed nuts such as walnuts or almonds.  Increase your intake of plant-based protein.  You should avoid fried foods.  Don't consume sugary or starchy foods and sugary drinks.  Avoid saturated fats.  Try not to dine at restaurants more than once per month, and don't dine at fast food places.  Try to get 7-9 hours of sleep every night.  Try to get 150 minutes per week of moderate intensity exercise (after I have cleared you to start an exercise program).  Try to maintain the appropriate weight for your height based on body mass index (BMI). Maintain your cholesterol, blood sugar, and blood pressure in the recommended respective normal ranges.  There is a wealth of information on the American Heart Association's website regarding this.  Just Google \"Life's Essential 8\" for more information.   Ask me about any of these details  if you have questions.    As your cardiologist, I will be available to you at any time to answer any question you have concerning your heart health.  My staff, [facility] can also answer any questions you may have.  Best of luck.       It is important for us to have an accurate list of the medications, supplements, and their doses.  It is also important for us to have an accurate list of your allergies.  Please bring this information to every appointment.  " This is a vital part of the quality of care you receive through all of your providers.

## 2024-07-16 NOTE — ASSESSMENT & PLAN NOTE
HTN:  BP is well controlled.   We discussed sodium restriction, lifestyle modification, and the DASH diet.  I advised the patient to check BPs at home daily, and to contact her PCP with an update in one month.    Orders:    Referral to Cardiology

## 2024-07-16 NOTE — PROGRESS NOTES
Referred by Dr. Shay for Please see below.     History Of Present Illness:    Julia Bowie is a 29 y.o. female presenting with chest pain.    I am seeing this 29 year old hypertensive woman with a BMI of 53.2 at the request of Yamini COURTNEY for evaluation of chest pain.    On July 5 2024, the patient went to the [facility] ER with sharp stabbing chest pain, and a headache.  The chest pain started when she was sitting on a couch at home.  The symptoms waxed and waned, with each episode lasted for a couple of minutes over a a couple of hours.  The pain was not exertional.  She felt diaphoretic at the time.  There was no radiation into the neck or jaw, arms or elsewhere.  She felt occasionally short of breath.  The pain was worse with deep inspiration.  She had a CXR, EKG, troponins, and D dimer.  She was treated with Toradol for migraine.  She received Reglan for nausea.  A brain CT was done.  She was released.      Since her release, she has had intermittent episodes of chest pain that is constant, 24/7 for the past four or five days.  The pain is not pleuritic.  It was reproducible with palpation today on exam.      PMH Migraines; asthma; BMI 53.2    Review of Systems:  Diarrhea, constipation, hemorrhoids.  Patient denies fevers, chills, nausea, vomiting, hematuria, dysuria, abdominal pain,  cough; all other review of systems is negative.            Past Medical History:  She has no past medical history on file.    Past Surgical History:  She has no past surgical history on file.      Social History:  She reports that she quit smoking about 6 months ago. Her smoking use included cigarettes. She has quit using smokeless tobacco. She reports current alcohol use. She reports that she does not use drugs.    Family History:  Family History   Problem Relation Name Age of Onset    No Known Problems Father      Cardiomyopathy Sister      Heart failure Maternal Grandfather          Allergies:  Iodinated contrast  "media, Iodine, Liraglutide (weight loss), Rhubarb, Shellfish containing products, and Vicodin [hydrocodone-acetaminophen]    Outpatient Medications:  Current Outpatient Medications   Medication Instructions    albuterol 2.5 mg /3 mL (0.083 %) nebulizer solution Inhale 3 mL 3 times a day by nebulization route as needed.    albuterol 90 mcg/actuation inhaler Inhale 2 puffs every 4 hours by inhalation route as needed.    ascorbic acid (VITAMIN C) 1,000 mg, oral, Daily    DULoxetine (CYMBALTA) 30 mg, oral, Daily    eletriptan (RELPAX) 40 mg, oral, Once as needed    Emgality Pen 120 mg, subcutaneous, Every 30 days    EPINEPHrine 0.3 mg/0.3 mL injection syringe 1 Syringe, intramuscular, Once as needed    escitalopram (LEXAPRO) 20 mg, oral, Daily    hydroCHLOROthiazide (MICROZIDE) 12.5 mg, oral, Daily    levonorgestrel (Mirena) 21 mcg/24 hours (8 yrs) 52 mg IUD 1 each, intrauterine, Once    omeprazole (PRILOSEC) 40 mg, oral, Daily before breakfast        Last Recorded Vitals:  Vitals:    07/16/24 1500   BP: 122/72   BP Location: Left arm   Patient Position: Sitting   Pulse: 92   Weight: (!) 163 kg (360 lb)   Height: 1.753 m (5' 9\")       Physical Exam:  CHEST: Clear to auscultation; her chest pain is identically reproducible with palpation in the left parasternal region.  CARDIAC: Regular rhythm and rate, normal S1 and S2, no murmur rub or gallop; carotids are brisk without bruits, PMI is not displaced  ABDOMEN: Active bowel sounds, no tenderness, no evidence of ascites  EXTREMITIES: No clubbing, cyanosis, edema, or tenderness         Last Labs:  CBC -  Lab Results   Component Value Date    WBC 7.1 02/19/2024    HGB 13.5 02/19/2024    HCT 41.7 02/19/2024    MCV 91 02/19/2024     02/19/2024       CMP -  Lab Results   Component Value Date    CALCIUM 9.6 02/19/2024    PROT 6.9 02/19/2024    ALBUMIN 4.4 02/19/2024    AST 15 02/19/2024    ALT 17 02/19/2024    ALKPHOS 56 02/19/2024    BILITOT 0.7 02/19/2024       LIPID " "PANEL -   Lab Results   Component Value Date    CHOL 165 02/19/2024    TRIG 96 02/19/2024    HDL 44.6 02/19/2024    CHHDL 3.7 02/19/2024    VLDL 19 02/19/2024    NHDL 120 02/19/2024       RENAL FUNCTION PANEL -   Lab Results   Component Value Date    GLUCOSE 96 02/19/2024     02/19/2024    K 4.5 02/19/2024     02/19/2024    CO2 29 02/19/2024    ANIONGAP 12 02/19/2024    BUN 13 02/19/2024    CREATININE 0.77 02/19/2024    CALCIUM 9.6 02/19/2024    ALBUMIN 4.4 02/19/2024        No results found for: \"BNP\", \"HGBA1C\"      Lab review: I have Chemistry CMP:   Lab Results   Component Value Date    ALBUMIN 4.4 02/19/2024    CALCIUM 9.6 02/19/2024    CO2 29 02/19/2024    CREATININE 0.77 02/19/2024    GLUCOSE 96 02/19/2024    BILITOT 0.7 02/19/2024    PROT 6.9 02/19/2024    ALT 17 02/19/2024    AST 15 02/19/2024    ALKPHOS 56 02/19/2024   , Chemistry BMP   Lab Results   Component Value Date    GLUCOSE 96 02/19/2024    CALCIUM 9.6 02/19/2024    CO2 29 02/19/2024    CREATININE 0.77 02/19/2024   , CBC:  Lab Results   Component Value Date    WBC 7.1 02/19/2024    RBC 4.61 02/19/2024    HGB 13.5 02/19/2024    HCT 41.7 02/19/2024    MCV 91 02/19/2024    MCH 29.3 02/19/2024    MCHC 32.4 02/19/2024    RDW 12.5 02/19/2024    NRBC 0.0 02/19/2024   , and Cardiac Enzymes: No results found for: \"CKTOTAL\", \"CKMB\", \"CKMBINDEX\", \"TROPONINT\"    Assessment/Plan   Assessment & Plan  Hypertension, unspecified type  HTN:  BP is well controlled.   We discussed sodium restriction, lifestyle modification, and the DASH diet.  I advised the patient to check BPs at home daily, and to contact her PCP with an update in one month.    Orders:    Referral to Cardiology    Chest pain, unspecified type  Pain is identically reproducible with palpation.  No additional cardiac testing is necessary at present.    Orders:    Referral to Cardiology    ECG 12 lead (Clinic Performed)            Cade Florez MD  "

## 2024-07-23 ENCOUNTER — LAB (OUTPATIENT)
Dept: LAB | Facility: LAB | Age: 29
End: 2024-07-23
Payer: COMMERCIAL

## 2024-07-23 DIAGNOSIS — R53.82 CHRONIC FATIGUE, UNSPECIFIED: Primary | ICD-10-CM

## 2024-07-23 DIAGNOSIS — R63.5 ABNORMAL WEIGHT GAIN: ICD-10-CM

## 2024-07-23 DIAGNOSIS — R06.83 SNORING: ICD-10-CM

## 2024-07-23 DIAGNOSIS — E55.9 VITAMIN D DEFICIENCY, UNSPECIFIED: ICD-10-CM

## 2024-07-23 LAB
25(OH)D3 SERPL-MCNC: 17 NG/ML (ref 30–100)
ALBUMIN SERPL BCP-MCNC: 4.2 G/DL (ref 3.4–5)
ALP SERPL-CCNC: 59 U/L (ref 33–110)
ALT SERPL W P-5'-P-CCNC: 16 U/L (ref 7–45)
ANION GAP SERPL CALC-SCNC: 13 MMOL/L (ref 10–20)
AST SERPL W P-5'-P-CCNC: 16 U/L (ref 9–39)
BILIRUB SERPL-MCNC: 0.5 MG/DL (ref 0–1.2)
BUN SERPL-MCNC: 12 MG/DL (ref 6–23)
CALCIUM SERPL-MCNC: 9 MG/DL (ref 8.6–10.3)
CHLORIDE SERPL-SCNC: 106 MMOL/L (ref 98–107)
CO2 SERPL-SCNC: 23 MMOL/L (ref 21–32)
CORTIS AM PEAK SERPL-MSCNC: 1 UG/DL (ref 5–20)
CREAT SERPL-MCNC: 0.69 MG/DL (ref 0.5–1.05)
EGFRCR SERPLBLD CKD-EPI 2021: >90 ML/MIN/1.73M*2
ERYTHROCYTE [DISTWIDTH] IN BLOOD BY AUTOMATED COUNT: 12.6 % (ref 11.5–14.5)
GLUCOSE SERPL-MCNC: 121 MG/DL (ref 74–99)
HCT VFR BLD AUTO: 40.4 % (ref 36–46)
HGB BLD-MCNC: 12.7 G/DL (ref 12–16)
MCH RBC QN AUTO: 28.3 PG (ref 26–34)
MCHC RBC AUTO-ENTMCNC: 31.4 G/DL (ref 32–36)
MCV RBC AUTO: 90 FL (ref 80–100)
NRBC BLD-RTO: 0 /100 WBCS (ref 0–0)
PLATELET # BLD AUTO: 308 X10*3/UL (ref 150–450)
POTASSIUM SERPL-SCNC: 4.4 MMOL/L (ref 3.5–5.3)
PROT SERPL-MCNC: 6.6 G/DL (ref 6.4–8.2)
RBC # BLD AUTO: 4.49 X10*6/UL (ref 4–5.2)
SODIUM SERPL-SCNC: 138 MMOL/L (ref 136–145)
T4 FREE SERPL-MCNC: 0.61 NG/DL (ref 0.61–1.12)
THYROPEROXIDASE AB SERPL-ACNC: <28 IU/ML
TSH SERPL-ACNC: 2.36 MIU/L (ref 0.44–3.98)
WBC # BLD AUTO: 6.1 X10*3/UL (ref 4.4–11.3)

## 2024-07-23 PROCEDURE — 82533 TOTAL CORTISOL: CPT

## 2024-07-23 PROCEDURE — 82306 VITAMIN D 25 HYDROXY: CPT

## 2024-07-23 PROCEDURE — 80375 DRUG/SUBSTANCE NOS 1-3: CPT

## 2024-07-23 PROCEDURE — 80053 COMPREHEN METABOLIC PANEL: CPT

## 2024-07-23 PROCEDURE — 85027 COMPLETE CBC AUTOMATED: CPT

## 2024-07-23 PROCEDURE — 84439 ASSAY OF FREE THYROXINE: CPT

## 2024-07-23 PROCEDURE — 36415 COLL VENOUS BLD VENIPUNCTURE: CPT

## 2024-07-23 PROCEDURE — 84443 ASSAY THYROID STIM HORMONE: CPT

## 2024-07-23 PROCEDURE — 86376 MICROSOMAL ANTIBODY EACH: CPT

## 2024-07-27 LAB — DEXAMETHASONE SERPL-MCNC: 333.1 NG/DL

## 2024-08-05 ENCOUNTER — APPOINTMENT (OUTPATIENT)
Dept: CARDIOLOGY | Facility: CLINIC | Age: 29
End: 2024-08-05
Payer: COMMERCIAL

## 2024-09-10 ENCOUNTER — LAB (OUTPATIENT)
Dept: LAB | Facility: LAB | Age: 29
End: 2024-09-10
Payer: COMMERCIAL

## 2024-09-10 DIAGNOSIS — R73.9 HYPERGLYCEMIA, UNSPECIFIED: Primary | ICD-10-CM

## 2024-09-10 LAB
GLUCOSE 1H P 75 G GLC PO SERPL-MCNC: 141 MG/DL
GLUCOSE 2H P 75 G GLC PO SERPL-MCNC: 123 MG/DL
GLUCOSE 3H P 75 G GLC PO SERPL-MCNC: 72 MG/DL
GLUCOSE P FAST SERPL-MCNC: 96 MG/DL

## 2024-09-10 PROCEDURE — 82952 GTT-ADDED SAMPLES: CPT

## 2024-09-10 PROCEDURE — 36415 COLL VENOUS BLD VENIPUNCTURE: CPT

## 2024-09-10 PROCEDURE — 82951 GLUCOSE TOLERANCE TEST (GTT): CPT

## 2024-09-25 DIAGNOSIS — F41.8 MIXED ANXIETY AND DEPRESSIVE DISORDER: ICD-10-CM

## 2024-09-25 RX ORDER — ESCITALOPRAM OXALATE 20 MG/1
20 TABLET ORAL DAILY
Qty: 90 TABLET | Refills: 0 | Status: SHIPPED | OUTPATIENT
Start: 2024-09-25

## 2024-10-07 ENCOUNTER — APPOINTMENT (OUTPATIENT)
Dept: AUDIOLOGY | Facility: CLINIC | Age: 29
End: 2024-10-07
Payer: COMMERCIAL

## 2024-10-07 DIAGNOSIS — H92.03 OTALGIA OF BOTH EARS: Primary | ICD-10-CM

## 2024-10-07 PROCEDURE — 92557 COMPREHENSIVE HEARING TEST: CPT

## 2024-10-07 PROCEDURE — 92567 TYMPANOMETRY: CPT

## 2024-10-07 ASSESSMENT — PAIN DESCRIPTION - DESCRIPTORS: DESCRIPTORS: DULL;ACHING

## 2024-10-07 ASSESSMENT — PAIN SCALES - GENERAL: PAINLEVEL_OUTOF10: 2

## 2024-10-07 NOTE — PROGRESS NOTES
AUDIOLOGIC EVALUATION  Name: Julia Bowie  YOB: 1995  MRN: [id number]  Age: 29 y.o.    Date of Evaluation:  10/7/2024    History:  Julia Bowie, 29 y.o., was seen today for a hearing evaluation prior to their appointment with Kandi Gardner MD.  The patient reported a history of chronic bilateral ear infections for several years. She noted that she gets approximately 2-3 ear infections per year. She noted bilateral otalgia. She has intermittent tinnitus in both ears that increases when she has an ear infection. She denied noticing a change in her hearing, dizziness and previous otologic surgery.    Evaluation:    Otoscopy  Clear canals bilaterally    Tympanometry  Right ear: Type A, normal ear canal volume and compliance.  Left ear: Type A, normal ear canal volume and compliance.     Acoustic Reflexes  Right ear: Could not maintain seal  Left ear:  Did not test due to patient report of pain with pressurization    Audiometric Evaluation  Right ear: normal hearing sensitivity. Word recognition ability estimated to be excellent(100%) at 50 dB HL based on an NU-6 recorded ordered by difficulty 10-word list.  Left ear: normal hearing sensitivity. Word recognition ability estimated to be excellent(100%) at 50 dB HL based on an NU-6 recorded ordered by difficulty 10-word list.    The test results were discussed with the patient.     Impressions  Today's evaluation revealed normal hearing, excellent word understanding, and normal middle ear function bilaterally.    Recommendations  - Continue medical follow-up with established providers   - Continue medical follow-up with Dr. Gardner  - Re-test hearing as medically indicated or sooner if concerns arise    Time: 9177-3854    YASSINE Givens, CCC-A  Licensed Audiologist

## 2024-10-14 ENCOUNTER — APPOINTMENT (OUTPATIENT)
Dept: OTOLARYNGOLOGY | Facility: CLINIC | Age: 29
End: 2024-10-14
Payer: COMMERCIAL

## 2024-10-14 ENCOUNTER — OFFICE VISIT (OUTPATIENT)
Dept: OTOLARYNGOLOGY | Facility: CLINIC | Age: 29
End: 2024-10-14
Payer: COMMERCIAL

## 2024-10-14 VITALS — DIASTOLIC BLOOD PRESSURE: 80 MMHG | HEIGHT: 69 IN | BODY MASS INDEX: 53.16 KG/M2 | SYSTOLIC BLOOD PRESSURE: 122 MMHG

## 2024-10-14 DIAGNOSIS — M26.609 TEMPOROMANDIBULAR JOINT DISORDER: Primary | ICD-10-CM

## 2024-10-14 DIAGNOSIS — Z86.69 HISTORY OF ACUTE OTITIS MEDIA: ICD-10-CM

## 2024-10-14 PROCEDURE — 1036F TOBACCO NON-USER: CPT | Performed by: STUDENT IN AN ORGANIZED HEALTH CARE EDUCATION/TRAINING PROGRAM

## 2024-10-14 PROCEDURE — 3079F DIAST BP 80-89 MM HG: CPT | Performed by: STUDENT IN AN ORGANIZED HEALTH CARE EDUCATION/TRAINING PROGRAM

## 2024-10-14 PROCEDURE — 92504 EAR MICROSCOPY EXAMINATION: CPT | Performed by: STUDENT IN AN ORGANIZED HEALTH CARE EDUCATION/TRAINING PROGRAM

## 2024-10-14 PROCEDURE — 3074F SYST BP LT 130 MM HG: CPT | Performed by: STUDENT IN AN ORGANIZED HEALTH CARE EDUCATION/TRAINING PROGRAM

## 2024-10-14 PROCEDURE — 99203 OFFICE O/P NEW LOW 30 MIN: CPT | Performed by: STUDENT IN AN ORGANIZED HEALTH CARE EDUCATION/TRAINING PROGRAM

## 2024-10-14 ASSESSMENT — ENCOUNTER SYMPTOMS
NECK PAIN: 0
VOMITING: 0
DIARRHEA: 0
COUGH: 0
RHINORRHEA: 0
SORE THROAT: 0
ABDOMINAL PAIN: 0
HEADACHES: 1

## 2024-10-14 NOTE — PROGRESS NOTES
ENT Outpatient Consultation    Chief Complaint: otalgia  History Of Present Illness  Julia Bowie is a 29 y.o. female presents for evaluation of bilateral otalgia and recurrent ear infections. She reports a history of 2-3 double ear infections per year. 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 Last one was 1 month ago and she was on cefdinir. No otorrhea during infections. No history of PE tubes or recurrent ear infections as a kid. She is on OTC claritin and prn flonase for seasonal allergies. She endorses TMJ and has clicking and popping with jaw opening. She has a mouth guard for nighttime but doesn't use it frequently.      Past Medical History  She has no past medical history on file.    Surgical History  She has no past surgical history on file.     Social History  She reports that she quit smoking about 9 months ago. Her smoking use included cigarettes. She has quit using smokeless tobacco. She reports current alcohol use. She reports that she does not use drugs.    Family History  Family History   Problem Relation Name Age of Onset    No Known Problems Father      Cardiomyopathy Sister      Heart failure Maternal Grandfather          Allergies  Iodinated contrast media, Iodine, Liraglutide (weight loss), Rhubarb, Shellfish containing products, and Vicodin [hydrocodone-acetaminophen]     Physical Exam:  CONSTITUTIONAL:  No acute distress  VOICE:  No hoarseness or other abnormality  RESPIRATION:  Breathing comfortably, no stridor  EYES:  EOM intact, sclera normal  NEURO:  Alert and oriented times 3, Cranial nerves II-XII grossly intact and symmetric bilaterally  HEAD AND FACE:  Symmetric facial features, no masses or lesions, palpable clicking and audible popping with jaw opening, left > right  EARS:  Normal external ears, external auditory canals, and TMs to otoscopy, normal hearing to whispered voice.  NOSE:  External nose  "midline, anterior rhinoscopy is normal with limited visualization to the anterior aspect of the interior turbinates with mild hypertrophy, no bleeding or drainage, no lesions  ORAL CAVITY/OROPHARYNX/LIPS:  Normal mucous membranes, normal floor of mouth/tongue/OP, no masses or lesions  PHARYNGEAL WALLS:  No masses or lesions  NECK/LYMPH:  No LAD, no thyroid masses, trachea midline  SKIN:  Neck skin is without scar or injury  PSYCH:  Alert and oriented with appropriate mood and affect    Procedure: Otomicroscopy  Right: EAC patent and normal, TM intact without effusion or retraction, middle ear space clear and able to autoinsufflate.  Left: EAC patent and normal, TM intact without effusion, mild retraction but able to autoinsufflate, middle ear space clear        Last Recorded Vitals  Blood pressure 122/80, height 1.753 m (5' 9\").    Relevant Results  Audiogram 10/17:    My independent interpretation: normal, symmetric hearing bilaterally with normal tympanograms and 100% WRS      Assessment and Plan  29 y.o. female with recurrent ear infections and left>right otalgia in the setting of severe TMJ. Ear exam is benign today without effusion and given the history of 2-3 infections/year, we discussed that I would not recommend PE tubes with that frequency of infections. Her chronic otalgia is most likely related to TMJ as she has severe popping/clicking of her TMJ bilaterally, worse on the left. Encouraged a soft diet, warm compresses, ibuprofen when otalgia is exacerbated. TMJ handouts provided. RTC prn.    Problem List Items Addressed This Visit          ENT    Temporomandibular joint disorder - Primary     Other Visit Diagnoses       History of acute otitis media                Kandi Gardner MD  "

## 2024-12-09 ENCOUNTER — APPOINTMENT (OUTPATIENT)
Dept: PRIMARY CARE | Facility: CLINIC | Age: 29
End: 2024-12-09
Payer: COMMERCIAL

## 2024-12-09 VITALS
HEART RATE: 78 BPM | RESPIRATION RATE: 16 BRPM | WEIGHT: 293 LBS | OXYGEN SATURATION: 98 % | DIASTOLIC BLOOD PRESSURE: 90 MMHG | HEIGHT: 69 IN | TEMPERATURE: 97 F | BODY MASS INDEX: 43.4 KG/M2 | SYSTOLIC BLOOD PRESSURE: 120 MMHG

## 2024-12-09 DIAGNOSIS — J18.9 PNEUMONIA DUE TO INFECTIOUS ORGANISM, UNSPECIFIED LATERALITY, UNSPECIFIED PART OF LUNG: ICD-10-CM

## 2024-12-09 DIAGNOSIS — R63.5 WEIGHT GAIN: Primary | ICD-10-CM

## 2024-12-09 DIAGNOSIS — F41.8 MIXED ANXIETY AND DEPRESSIVE DISORDER: ICD-10-CM

## 2024-12-09 DIAGNOSIS — G43.809 OTHER MIGRAINE WITHOUT STATUS MIGRAINOSUS, NOT INTRACTABLE: ICD-10-CM

## 2024-12-09 PROCEDURE — 99214 OFFICE O/P EST MOD 30 MIN: CPT | Performed by: NURSE PRACTITIONER

## 2024-12-09 RX ORDER — HYDROXYZINE HYDROCHLORIDE 10 MG/1
10 TABLET, FILM COATED ORAL EVERY 8 HOURS PRN
Qty: 60 TABLET | Refills: 0 | Status: SHIPPED | OUTPATIENT
Start: 2024-12-09 | End: 2025-01-08

## 2024-12-09 RX ORDER — GALCANEZUMAB 120 MG/ML
120 INJECTION, SOLUTION SUBCUTANEOUS
Qty: 3 EACH | Refills: 0 | Status: SHIPPED | OUTPATIENT
Start: 2024-12-09

## 2024-12-09 RX ORDER — ELETRIPTAN HYDROBROMIDE 40 MG/1
40 TABLET, FILM COATED ORAL ONCE AS NEEDED
Qty: 6 TABLET | Refills: 1 | Status: SHIPPED | OUTPATIENT
Start: 2024-12-09

## 2024-12-09 ASSESSMENT — ENCOUNTER SYMPTOMS
ABDOMINAL PAIN: 0
ASSOCIATED PULMONARY SYMPTOMS: BACK PAIN
CHEST TIGHTNESS: 0
COUGH: 0
SHORTNESS OF BREATH: 0
SORE THROAT: 0
WEIGHT LOSS: 0
FEVER: 0
WHEEZING: 0
DIFFICULTY BREATHING: 0

## 2024-12-09 NOTE — PROGRESS NOTES
Subjective   Patient ID: Julia Bowie is a 29 y.o. female who presents for Pneumonia.    Patient is following up on her pneumonia, she has been on 2 rounds of antibiotics and steroids and is feeling much better.     Pneumonia  There is no chest tightness, cough, difficulty breathing, shortness of breath or wheezing. This is a new problem. The current episode started more than 1 month ago. The problem has been gradually improving. Pertinent negatives include no chest pain, ear pain, fever, headaches, myalgias, nasal congestion, postnasal drip, sore throat or weight loss. Associated symptoms comments: Back pain. Her past medical history is significant for asthma.   She was initially treated for pneumonia from an Urgent Care facility with Augmentin for 10 days + a Medrol dospak. She was still experiencing back pain after finishing her prescriptions, her repeat xray was still showing pnuemonia. She was then treated with a Z-airam and prednisone and states that her symptoms completely resolved.   She is also complaining of increased anxiety and stress. 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   She also mentions that she was started on ozempic. She initially did lose weight, but she wasn't able to reach the treating provider for a refill, so she stopped taking.   She is also requesting medications to be refilled today.     Review of Systems   Constitutional:  Negative for chills, fever and weight loss.   HENT:  Negative for congestion, ear pain, postnasal drip, sinus pain and sore throat.    Respiratory:  Negative for cough, shortness of breath and wheezing.    Cardiovascular:  Negative for chest pain and palpitations.   Gastrointestinal:  Negative for abdominal pain.   Musculoskeletal:  Negative for myalgias.   Neurological:  Negative for dizziness and headaches.   Psychiatric/Behavioral:  Negative for dysphoric mood. The patient is  "nervous/anxious.        Objective   /90 (BP Location: Right arm, Patient Position: Sitting, BP Cuff Size: Large adult)   Pulse 78   Temp 36.1 °C (97 °F) (Temporal)   Resp 16   Ht 1.753 m (5' 9\")   Wt (!) 158 kg (349 lb)   SpO2 98%   BMI 51.54 kg/m²     Physical Exam  Vitals and nursing note reviewed.   Constitutional:       General: She is not in acute distress.     Appearance: Normal appearance. She is obese.   HENT:      Right Ear: Tympanic membrane normal.      Left Ear: Tympanic membrane normal.      Nose: No congestion.      Mouth/Throat:      Pharynx: No oropharyngeal exudate or posterior oropharyngeal erythema.   Eyes:      Conjunctiva/sclera: Conjunctivae normal.   Cardiovascular:      Rate and Rhythm: Normal rate and regular rhythm.      Heart sounds: Normal heart sounds.   Pulmonary:      Effort: Pulmonary effort is normal.      Breath sounds: Normal breath sounds. No wheezing, rhonchi or rales.   Lymphadenopathy:      Cervical: No cervical adenopathy.   Skin:     General: Skin is warm and dry.   Neurological:      Mental Status: She is alert.   Psychiatric:         Mood and Affect: Mood normal.         Behavior: Behavior normal.         Assessment/Plan   Problem List Items Addressed This Visit             ICD-10-CM    Migraine G43.909    Relevant Medications    eletriptan (Relpax) 40 mg tablet    galcanezumab (Emgality Pen) 120 mg/mL auto-injector    Mixed anxiety and depressive disorder F41.8    Relevant Medications    hydrOXYzine HCL (Atarax) 10 mg tablet     Other Visit Diagnoses         Codes    Weight gain    -  Primary R63.5    Relevant Medications    tirzepatide, weight loss, (Zepbound) 2.5 mg/0.5 mL injection    BMI 50.0-59.9, adult (Multi)     Z68.43    Pneumonia due to infectious organism, unspecified laterality, unspecified part of lung     J18.9        Pneumonia: Resolved.   Migraine: Stable.   Anxiety: Continue with cymbalta. Add hydroxyzine as needed.   Continue with current " medications as directed for chronic health conditions.   Weight gain: Start Zepbound 2.5mg weekly. Educated on medication and black box warning of thyroid C-cell tumor risk. If developing any neck mass, dysphagia, dyspnea or persistent hoarseness, stop medication and follow up immediately.   Follow up in 1 month for recheck, or sooner with any additional concerns.

## 2024-12-10 ASSESSMENT — ENCOUNTER SYMPTOMS
DIZZINESS: 0
HEADACHES: 0
CHILLS: 0
NERVOUS/ANXIOUS: 1
SINUS PAIN: 0
DYSPHORIC MOOD: 0
MYALGIAS: 0
PALPITATIONS: 0

## 2025-01-13 ENCOUNTER — APPOINTMENT (OUTPATIENT)
Dept: PRIMARY CARE | Facility: CLINIC | Age: 30
End: 2025-01-13
Payer: COMMERCIAL

## 2025-02-27 DIAGNOSIS — F41.8 MIXED ANXIETY AND DEPRESSIVE DISORDER: ICD-10-CM

## 2025-02-27 DIAGNOSIS — G43.809 OTHER MIGRAINE WITHOUT STATUS MIGRAINOSUS, NOT INTRACTABLE: ICD-10-CM

## 2025-02-27 RX ORDER — ESCITALOPRAM OXALATE 20 MG/1
20 TABLET ORAL DAILY
Qty: 90 TABLET | Refills: 0 | Status: SHIPPED | OUTPATIENT
Start: 2025-02-27

## 2025-02-27 RX ORDER — GALCANEZUMAB 120 MG/ML
120 INJECTION, SOLUTION SUBCUTANEOUS
Qty: 1 EACH | Refills: 2 | Status: SHIPPED | OUTPATIENT
Start: 2025-02-27

## 2025-02-27 RX ORDER — DULOXETIN HYDROCHLORIDE 30 MG/1
30 CAPSULE, DELAYED RELEASE ORAL DAILY
Qty: 90 CAPSULE | Refills: 0 | Status: SHIPPED | OUTPATIENT
Start: 2025-02-27

## 2025-04-09 ENCOUNTER — APPOINTMENT (OUTPATIENT)
Dept: OBSTETRICS AND GYNECOLOGY | Facility: CLINIC | Age: 30
End: 2025-04-09
Payer: COMMERCIAL

## 2025-04-17 ENCOUNTER — APPOINTMENT (OUTPATIENT)
Dept: PRIMARY CARE | Facility: CLINIC | Age: 30
End: 2025-04-17
Payer: COMMERCIAL

## 2025-06-16 ENCOUNTER — TELEPHONE (OUTPATIENT)
Dept: PRIMARY CARE | Facility: CLINIC | Age: 30
End: 2025-06-16
Payer: COMMERCIAL

## 2025-06-16 NOTE — TELEPHONE ENCOUNTER
Patient of liborio Nichols submitted for emgality 120mg    It is approved through [facility] [employer] from 06/13/2025 to 06/13/2026  Sent approval letter over to  [facility] [place]